# Patient Record
Sex: FEMALE | Race: BLACK OR AFRICAN AMERICAN
[De-identification: names, ages, dates, MRNs, and addresses within clinical notes are randomized per-mention and may not be internally consistent; named-entity substitution may affect disease eponyms.]

---

## 2018-05-09 ENCOUNTER — HOSPITAL ENCOUNTER (INPATIENT)
Dept: HOSPITAL 72 - EMR | Age: 73
LOS: 3 days | Discharge: HOME | DRG: 312 | End: 2018-05-12
Payer: MEDICARE

## 2018-05-09 VITALS — DIASTOLIC BLOOD PRESSURE: 69 MMHG | SYSTOLIC BLOOD PRESSURE: 162 MMHG

## 2018-05-09 VITALS — BODY MASS INDEX: 29.82 KG/M2 | WEIGHT: 190 LBS | HEIGHT: 67 IN

## 2018-05-09 DIAGNOSIS — R19.7: ICD-10-CM

## 2018-05-09 DIAGNOSIS — I11.9: ICD-10-CM

## 2018-05-09 DIAGNOSIS — R55: Primary | ICD-10-CM

## 2018-05-09 DIAGNOSIS — F09: ICD-10-CM

## 2018-05-09 DIAGNOSIS — I16.0: ICD-10-CM

## 2018-05-09 PROCEDURE — 93880 EXTRACRANIAL BILAT STUDY: CPT

## 2018-05-09 PROCEDURE — 85730 THROMBOPLASTIN TIME PARTIAL: CPT

## 2018-05-09 PROCEDURE — 87045 FECES CULTURE AEROBIC BACT: CPT

## 2018-05-09 PROCEDURE — 83690 ASSAY OF LIPASE: CPT

## 2018-05-09 PROCEDURE — 93005 ELECTROCARDIOGRAM TRACING: CPT

## 2018-05-09 PROCEDURE — 80053 COMPREHEN METABOLIC PANEL: CPT

## 2018-05-09 PROCEDURE — 83880 ASSAY OF NATRIURETIC PEPTIDE: CPT

## 2018-05-09 PROCEDURE — 81003 URINALYSIS AUTO W/O SCOPE: CPT

## 2018-05-09 PROCEDURE — 87181 SC STD AGAR DILUTION PER AGT: CPT

## 2018-05-09 PROCEDURE — 84484 ASSAY OF TROPONIN QUANT: CPT

## 2018-05-09 PROCEDURE — 85025 COMPLETE CBC W/AUTO DIFF WBC: CPT

## 2018-05-09 PROCEDURE — 86850 RBC ANTIBODY SCREEN: CPT

## 2018-05-09 PROCEDURE — 86901 BLOOD TYPING SEROLOGIC RH(D): CPT

## 2018-05-09 PROCEDURE — 99285 EMERGENCY DEPT VISIT HI MDM: CPT

## 2018-05-09 PROCEDURE — 86900 BLOOD TYPING SEROLOGIC ABO: CPT

## 2018-05-09 PROCEDURE — 93306 TTE W/DOPPLER COMPLETE: CPT

## 2018-05-09 PROCEDURE — 85610 PROTHROMBIN TIME: CPT

## 2018-05-09 PROCEDURE — 82550 ASSAY OF CK (CPK): CPT

## 2018-05-09 PROCEDURE — 71045 X-RAY EXAM CHEST 1 VIEW: CPT

## 2018-05-09 PROCEDURE — 87324 CLOSTRIDIUM AG IA: CPT

## 2018-05-09 PROCEDURE — 36415 COLL VENOUS BLD VENIPUNCTURE: CPT

## 2018-05-10 VITALS — DIASTOLIC BLOOD PRESSURE: 59 MMHG | SYSTOLIC BLOOD PRESSURE: 194 MMHG

## 2018-05-10 VITALS — SYSTOLIC BLOOD PRESSURE: 177 MMHG | DIASTOLIC BLOOD PRESSURE: 76 MMHG

## 2018-05-10 VITALS — DIASTOLIC BLOOD PRESSURE: 84 MMHG | SYSTOLIC BLOOD PRESSURE: 209 MMHG

## 2018-05-10 VITALS — SYSTOLIC BLOOD PRESSURE: 143 MMHG | DIASTOLIC BLOOD PRESSURE: 76 MMHG

## 2018-05-10 VITALS — SYSTOLIC BLOOD PRESSURE: 189 MMHG | DIASTOLIC BLOOD PRESSURE: 77 MMHG

## 2018-05-10 VITALS — SYSTOLIC BLOOD PRESSURE: 147 MMHG | DIASTOLIC BLOOD PRESSURE: 76 MMHG

## 2018-05-10 VITALS — DIASTOLIC BLOOD PRESSURE: 89 MMHG | SYSTOLIC BLOOD PRESSURE: 177 MMHG

## 2018-05-10 VITALS — DIASTOLIC BLOOD PRESSURE: 80 MMHG | SYSTOLIC BLOOD PRESSURE: 148 MMHG

## 2018-05-10 VITALS — SYSTOLIC BLOOD PRESSURE: 128 MMHG | DIASTOLIC BLOOD PRESSURE: 63 MMHG

## 2018-05-10 VITALS — DIASTOLIC BLOOD PRESSURE: 90 MMHG | SYSTOLIC BLOOD PRESSURE: 152 MMHG

## 2018-05-10 VITALS — DIASTOLIC BLOOD PRESSURE: 60 MMHG | SYSTOLIC BLOOD PRESSURE: 122 MMHG

## 2018-05-10 LAB
ADD MANUAL DIFF: NO
ALBUMIN SERPL-MCNC: 3.3 G/DL (ref 3.4–5)
ALBUMIN/GLOB SERPL: 0.8 {RATIO} (ref 1–2.7)
ALP SERPL-CCNC: 53 U/L (ref 46–116)
ALT SERPL-CCNC: 27 U/L (ref 12–78)
ANION GAP SERPL CALC-SCNC: 9 MMOL/L (ref 5–15)
APPEARANCE UR: CLEAR
APTT BLD: 22 SEC (ref 23–33)
APTT PPP: YELLOW S
AST SERPL-CCNC: 37 U/L (ref 15–37)
BASOPHILS NFR BLD AUTO: 0.4 % (ref 0–2)
BILIRUB SERPL-MCNC: 0.5 MG/DL (ref 0.2–1)
BUN SERPL-MCNC: 14 MG/DL (ref 7–18)
CALCIUM SERPL-MCNC: 8.7 MG/DL (ref 8.5–10.1)
CHLORIDE SERPL-SCNC: 108 MMOL/L (ref 98–107)
CK SERPL-CCNC: 163 U/L (ref 26–308)
CO2 SERPL-SCNC: 23 MMOL/L (ref 21–32)
CREAT SERPL-MCNC: 0.9 MG/DL (ref 0.55–1.3)
EOSINOPHIL NFR BLD AUTO: 1.2 % (ref 0–3)
ERYTHROCYTE [DISTWIDTH] IN BLOOD BY AUTOMATED COUNT: 12.8 % (ref 11.6–14.8)
GLOBULIN SER-MCNC: 4.1 G/DL
GLUCOSE UR STRIP-MCNC: NEGATIVE MG/DL
HCT VFR BLD CALC: 37 % (ref 37–47)
HGB BLD-MCNC: 12 G/DL (ref 12–16)
INR PPP: 1 (ref 0.9–1.1)
KETONES UR QL STRIP: NEGATIVE
LEUKOCYTE ESTERASE UR QL STRIP: NEGATIVE
LYMPHOCYTES NFR BLD AUTO: 11.2 % (ref 20–45)
MCV RBC AUTO: 89 FL (ref 80–99)
MONOCYTES NFR BLD AUTO: 4.9 % (ref 1–10)
NEUTROPHILS NFR BLD AUTO: 82.2 % (ref 45–75)
NITRITE UR QL STRIP: NEGATIVE
PH UR STRIP: 7 [PH] (ref 4.5–8)
PLATELET # BLD: 198 K/UL (ref 150–450)
POTASSIUM SERPL-SCNC: 4.5 MMOL/L (ref 3.5–5.1)
PROT UR QL STRIP: NEGATIVE
RBC # BLD AUTO: 4.16 M/UL (ref 4.2–5.4)
SODIUM SERPL-SCNC: 140 MMOL/L (ref 136–145)
SP GR UR STRIP: 1.01 (ref 1–1.03)
UROBILINOGEN UR-MCNC: NORMAL MG/DL (ref 0–1)
WBC # BLD AUTO: 7.9 K/UL (ref 4.8–10.8)

## 2018-05-10 RX ADMIN — HEPARIN SODIUM SCH UNITS: 5000 INJECTION INTRAVENOUS; SUBCUTANEOUS at 21:02

## 2018-05-10 RX ADMIN — HEPARIN SODIUM SCH UNITS: 5000 INJECTION INTRAVENOUS; SUBCUTANEOUS at 09:26

## 2018-05-10 RX ADMIN — HYDRALAZINE HYDROCHLORIDE SCH MG: 25 TABLET ORAL at 09:25

## 2018-05-10 NOTE — CARDIOLOGY REPORT
--------------- APPROVED REPORT --------------





EKG Measurement

Heart [id number]

IA 

[id number]

[id number]

[id number]

[id number]





Normal sinus rhythm

Left axis deviation

Minimal voltage criteria for LVH, may be normal variant

Nonspecific T wave abnormality

Abnormal ECG

## 2018-05-10 NOTE — HISTORY AND PHYSICAL REPORT
DATE OF ADMISSION:  05/09/2018



CHIEF COMPLAINT:  Syncopal episode.



HISTORY OF PRESENT ILLNESS:  This is a 73-year-old  female

who was brought in by the paramedics.  The patient was on the toilet seat

when she passed out.  She is confused and unable to give any further

information.



PAST MEDICAL HISTORY:

1. Hypertensive cardiovascular disease.

2. Organic brain syndrome.



HOME MEDICATIONS:  Clonidine, hydroxyzine, multivitamin, omeprazole, and

potassium chloride.



ALLERGIES:  No known drug allergies.



FAMILY HISTORY:  Unable to obtain.



SOCIAL HISTORY:  Unable to obtain.



REVIEW OF SYSTEMS:  Unable to obtain.  She is confused.



PHYSICAL EXAMINATION:

GENERAL:  This is an elderly  female who is in no acute

distress.

VITAL SIGNS:  Blood pressure 148/80, pulse 64 and regular, respirations 20,

and temperature 98.9 degrees.

HEENT:  The head is normocephalic and atraumatic.  Pupils are equal, round,

and reactive to light and accommodation consensually.

NECK:  Supple.  Trachea midline.  There was no lymphadenopathy or

thyromegaly.

LUNGS:  Clear to auscultation and percussion.

HEART:  Regular rate and rhythm without rubs, murmurs, or gallops.

ABDOMEN:  Soft and nontender.  Bowel sounds were active.

EXTREMITIES:  No clubbing, cyanosis, or edema.

NEUROLOGICAL:  She is alert, but confused.  There were no gross focal

findings.



LABORATORY AND ANCILLARY DATA:  CBC within normal limits.  CMP, glucose

152, otherwise within normal limits.  Chest x-ray, no acute disease.



ASSESSMENT:  Syncopal episode.  No clear etiology.



PLAN:

1. Obtain a 2D echo and carotid duplex.

2. Obtain orthostatic vital signs.









  ______________________________________________

  Alex Ramirez M.D.





DR:  FRANCIS

D:  05/10/2018 16:32

T:  05/10/2018 23:15

JOB#:  [id number]

CC:

## 2018-05-10 NOTE — DIAGNOSTIC IMAGING REPORT
Indication: Dyspnea

 

Comparison:  12/31/2014

 

A single view chest radiograph was obtained.

 

Findings:

 

No definite infiltrate or pulmonary vascular congestion identified.  The heart is

enlarged. The aorta is mildly enlarged consistent with atherosclerotic vascular

disease.  The bones are osteopenic.

 

Impression:

 

No acute disease

## 2018-05-10 NOTE — EMERGENCY ROOM REPORT
History of Present Illness


General


Chief Complaint:  Syncope


Source:  Patient, EMS





Present Illness


HPI


The patient presents with syncope.  She was sitting on the toilet when she 

passed out.  She's been having loose stools.  In addition to that she's been 

complaining about epigastric and diffuse abdominal crampiness.  She rates pain 

at 0 for RN and will not rate for me.  Diffuse in abdomen, not radiate.





The patient was admitted several years ago for syncopal episode and required 

blood transfusions.  She denies melena, coffee-ground emesis he hematemesis or 

hematochezia.





She also denies chest pain, palpitations, fever chills.  She denies taking 

antibiotics recently.





Paramedics found her with stable vital signs but then she became hypotensive 

when they stood her up.  They started giving her IV fluids at that time.





H/O HTN.


Allergies:  


Coded Allergies:  


     No Known Allergies (Unverified , 12/31/14)





Patient History


Past Medical History:  see triage record


Social History:  Denies: smoking, alcohol use, drug use


Social History Narrative


Born in [facility]


Last Menstrual Period:  NA


Pregnant Now:  No


Reviewed Nursing Documentation:  PMH: Agreed; PSxH: Agreed





Nursing Documentation-PMH


Hx Cardiac Problems:  Yes


Hx Hypertension:  Yes





Review of Systems


All Other Systems:  negative except mentioned in HPI





Physical Exam





Vital Signs








  Date Time  Temp Pulse Resp B/P (MAP) Pulse Ox O2 Delivery O2 Flow Rate FiO2


 


5/9/18 23:14 98.7 60 18 166/79 98 Room Air  





 98.8       








Sp02 EP Interpretation:  reviewed, normal


General Appearance:  well appearing, no apparent distress, GCS 15


Head:  normocephalic


Eyes:  bilateral eye PERRL, bilateral eye conjunctivae pale


ENT:  moist mucus membranes


Neck:  supple


Respiratory:  lungs clear, normal breath sounds


Cardiovascular #1:  regular rate, rhythm


Cardiovascular #2:  2+ radial (R)


Gastrointestinal:  normal inspection, normal bowel sounds, no mass, non-

distended, no guarding, no rebound, tenderness - diffuse


Rectal:  heme negative stool - copious diarrhea


Musculoskeletal:  back normal, gait/station normal, normal range of motion


Neurologic:  alert, oriented x3, CNs III-XII nml as tested, motor strength/tone 

normal, DTRs symmetric, sensory intact, cerebellar normal, speech normal


Psychiatric:  mood/affect normal


Skin:  normal inspection, warm/dry





Medical Decision Making


Diagnostic Impression:  


 Primary Impression:  


 Syncope


 Qualified Codes:  R55 - Syncope and collapse


 Additional Impressions:  


 Colitis


 Hypertension


 Qualified Codes:  I10 - Essential (primary) hypertension


ER Course


Patient presents with syncope.  Differential includes gastroenteritis, vasovagal

, arrhythmia, acute myocardial infarction amongst others.  The patient was 

evaluated EKG, chest x-ray and labs.  Based on the neurologic exam and the 

history, a CT of the head is not indicated at this time  Patient will receive 

IV hydration.  The fact that she was orthostatic in the field suggest volume 

depletion.  She is quite pale at this time and we will set up to possible 

transfuse if necessary.  





EKG shows no injury.  Chest x-ray is unremarkable.  Laboratory with normal 

white count. H&H is minimally low.  CMP is unremarkable





The patient had several bouts of copious amounts of watery diarrhea that was 

guaiac negative.  This was sent for a stool culture and also C. difficile.





The patient's blood pressure became quite high.  This necessitated treatment 

with clonidine 0.1 mg twice.





The patient is improved with observation however due to the syncopal episode we 

need to admit her for cardiac observation.





The patient is admitted to Dr. Witt to telemetry.





Laboratory Tests








Test


  5/9/18


23:41 5/10/18


02:38


 


White Blood Count


  7.9 K/UL


(4.8-10.8) 


 


 


Red Blood Count


  4.16 M/UL


(4.20-5.40)  L 


 


 


Hemoglobin


  12.0 G/DL


(12.0-16.0) 


 


 


Hematocrit


  37.0 %


(37.0-47.0) 


 


 


Mean Corpuscular Volume 89 FL (80-99)   


 


Mean Corpuscular Hemoglobin


  28.8 PG


(27.0-31.0) 


 


 


Mean Corpuscular Hemoglobin


Concent 32.4 G/DL


(32.0-36.0) 


 


 


Red Cell Distribution Width


  12.8 %


(11.6-14.8) 


 


 


Platelet Count


  198 K/UL


(150-450) 


 


 


Mean Platelet Volume


  7.8 FL


(6.5-10.1) 


 


 


Neutrophils (%) (Auto)


  82.2 %


(45.0-75.0)  H 


 


 


Lymphocytes (%) (Auto)


  11.2 %


(20.0-45.0)  L 


 


 


Monocytes (%) (Auto)


  4.9 %


(1.0-10.0) 


 


 


Eosinophils (%) (Auto)


  1.2 %


(0.0-3.0) 


 


 


Basophils (%) (Auto)


  0.4 %


(0.0-2.0) 


 


 


Prothrombin Time


  10.2 SEC


(9.30-11.50) 


 


 


Prothrombin Time INR 1.0 (0.9-1.1)   


 


PTT


  22 SEC (23-33)


L 


 


 


Sodium Level


  140 MMOL/L


(136-145) 


 


 


Potassium Level


  4.5 MMOL/L


(3.5-5.1) 


 


 


Chloride Level


  108 MMOL/L


()  H 


 


 


Carbon Dioxide Level


  23 MMOL/L


(21-32) 


 


 


Anion Gap


  9 mmol/L


(5-15) 


 


 


Blood Urea Nitrogen


  14 mg/dL


(7-18) 


 


 


Creatinine


  0.9 MG/DL


(0.55-1.30) 


 


 


Estimate Glomerular


Filtration Rate  mL/min (>60)  


  


 


 


Glucose Level


  152 MG/DL


()  H 


 


 


Calcium Level


  8.7 MG/DL


(8.5-10.1) 


 


 


Total Bilirubin


  0.5 MG/DL


(0.2-1.0) 


 


 


Aspartate Amino Transferase


(AST) 37 U/L (15-37)


  


 


 


Alanine Aminotransferase (ALT)


  27 U/L (12-78)


  


 


 


Alkaline Phosphatase


  53 U/L


() 


 


 


Total Creatine Kinase


  163 U/L


() 


 


 


Troponin I


  0.000 ng/mL


(0.000-0.056) 


 


 


Pro-B-Type Natriuretic Peptide


  35 pg/mL


(0-125) 


 


 


Total Protein


  7.4 G/DL


(6.4-8.2) 


 


 


Albumin


  3.3 G/DL


(3.4-5.0)  L 


 


 


Globulin 4.1 g/dL   


 


Albumin/Globulin Ratio


  0.8 (1.0-2.7)


L 


 


 


Lipase


  124 U/L


() 


 


 


Urine Color  Yellow  


 


Urine Appearance  Clear  


 


Urine pH  7 (4.5-8.0)  


 


Urine Specific Gravity


  


  1.010


(1.005-1.035)


 


Urine Protein


  


  Negative


(NEGATIVE)


 


Urine Glucose (UA)


  


  Negative


(NEGATIVE)


 


Urine Ketones


  


  Negative


(NEGATIVE)


 


Urine Occult Blood


  


  1+ (NEGATIVE)


H


 


Urine Nitrite


  


  Negative


(NEGATIVE)


 


Urine Bilirubin


  


  Negative


(NEGATIVE)


 


Urine Urobilinogen


  


  Normal MG/DL


(0.0-1.0)


 


Urine Leukocyte Esterase


  


  Negative


(NEGATIVE)


 


Urine RBC


  


  2-4 /HPF (0 -


2)  H


 


Urine WBC


  


  0 /HPF (0 - 2)


 


 


Urine Squamous Epithelial


Cells 


  Few /LPF


(NONE/OCC)


 


Urine Bacteria


  


  None /HPF


(NONE)








EKG Diagnostic Results


Rate:  normal


Rhythm:  NSR


ST Segments:  no acute changes





Rhythm Strip Diag. Results


EP Interpretation:  yes


Rhythm:  NSR, no PVC's, no ectopy





Chest X-Ray Diagnostic Results


Chest X-Ray Diagnostic Results :  


   Chest X-Ray Ordered:  Yes


   Interpretation:  no consolidation, no effusion, no pneumothorax, other - 

Cardiomegaly


   Impression:  Other


   Electronically Signed by:  Electronically signed by Jarett Mendenhall MD


Status:  improved


Disposition:  ADMITTED AS INPATIENT


Condition:  Serious


Referrals:  


NON PHYSICIAN (PCP)











Jarett Mendenhall M.D. May 10, 2018 02:36

## 2018-05-11 VITALS — DIASTOLIC BLOOD PRESSURE: 74 MMHG | SYSTOLIC BLOOD PRESSURE: 156 MMHG

## 2018-05-11 VITALS — SYSTOLIC BLOOD PRESSURE: 169 MMHG | DIASTOLIC BLOOD PRESSURE: 77 MMHG

## 2018-05-11 VITALS — SYSTOLIC BLOOD PRESSURE: 153 MMHG | DIASTOLIC BLOOD PRESSURE: 77 MMHG

## 2018-05-11 VITALS — SYSTOLIC BLOOD PRESSURE: 125 MMHG | DIASTOLIC BLOOD PRESSURE: 71 MMHG

## 2018-05-11 VITALS — DIASTOLIC BLOOD PRESSURE: 66 MMHG | SYSTOLIC BLOOD PRESSURE: 133 MMHG

## 2018-05-11 VITALS — SYSTOLIC BLOOD PRESSURE: 166 MMHG | DIASTOLIC BLOOD PRESSURE: 76 MMHG

## 2018-05-11 RX ADMIN — HEPARIN SODIUM SCH UNITS: 5000 INJECTION INTRAVENOUS; SUBCUTANEOUS at 21:27

## 2018-05-11 RX ADMIN — HEPARIN SODIUM SCH UNITS: 5000 INJECTION INTRAVENOUS; SUBCUTANEOUS at 08:48

## 2018-05-11 RX ADMIN — HYDRALAZINE HYDROCHLORIDE SCH MG: 25 TABLET ORAL at 08:46

## 2018-05-11 NOTE — GENERAL PROGRESS NOTE
Assessment/Plan


Assessment/Plan


Syncope m/p post defecation (vaso depressive).


W/u in progress.


Check orthosthatics.


DW pt's daughter yesterday 7 PM





Subjective


Allergies:  


Coded Allergies:  


     No Known Allergies (Unverified , 12/31/14)


Subjective


No new c/o





Objective





Last 24 Hour Vital Signs








  Date Time  Temp Pulse Resp B/P (MAP) Pulse Ox O2 Delivery O2 Flow Rate FiO2


 


5/11/18 04:00  61      


 


5/11/18 04:00 98.0 72 18 133/66 97 Room Air  





 98.0       


 


5/11/18 00:00  57      


 


5/11/18 00:00 97.0 59 19 125/71 97 Room Air  





 97.0       


 


5/10/18 20:00 98.1 61 18 122/60 96 Room Air  





 98.1       


 


5/10/18 20:00  68      


 


5/10/18 16:00  58      


 


5/10/18 16:00 98.9 64 21 148/80 97 Room Air  





 98.9       


 


5/10/18 12:00  55      


 


5/10/18 12:00 98.0 54 19 128/63 97 Room Air  





 98.0       


 


5/10/18 09:25    140/76    


 


5/10/18 09:25    140/76    


 


5/10/18 08:00 98.0 53 20 143/76 98 Room Air  





 98.0       

















Intake and Output  


 


 5/10/18 5/11/18





 19:00 07:00


 


Intake Total 560 ml 


 


Balance 560 ml 


 


  


 


Intake Oral 560 ml 


 


# Voids 1 2








Height (Feet):  5


Height (Inches):  7.00


Weight (Pounds):  190


Objective


CV RR


Lungs CTA


Abd SNT. BS +


E No CCE











Alex Ramirez MD May 11, 2018 07:59

## 2018-05-12 VITALS — DIASTOLIC BLOOD PRESSURE: 90 MMHG | SYSTOLIC BLOOD PRESSURE: 182 MMHG

## 2018-05-12 VITALS — DIASTOLIC BLOOD PRESSURE: 94 MMHG | SYSTOLIC BLOOD PRESSURE: 141 MMHG

## 2018-05-12 VITALS — SYSTOLIC BLOOD PRESSURE: 184 MMHG | DIASTOLIC BLOOD PRESSURE: 97 MMHG

## 2018-05-12 VITALS — SYSTOLIC BLOOD PRESSURE: 185 MMHG | DIASTOLIC BLOOD PRESSURE: 91 MMHG

## 2018-05-12 RX ADMIN — HYDRALAZINE HYDROCHLORIDE SCH MG: 25 TABLET ORAL at 08:50

## 2018-05-12 RX ADMIN — HEPARIN SODIUM SCH UNITS: 5000 INJECTION INTRAVENOUS; SUBCUTANEOUS at 10:41

## 2018-05-12 NOTE — CARDIOLOGY REPORT
--------------- APPROVED REPORT --------------





EXAM: Two-dimensional and M-mode echocardiogram with Doppler and color 

Doppler.



INDICATION

Syncope 



M-Mode DIMENSIONS 

IVSd2.0 (0.7-1.1cm)Left Atrium (MM)3.3 (1.6-4.0cm)

LVDd4.4 (3.5-5.6cm)Aortic Root3.4 (2.0-3.7cm)

PWd1.5 (0.7-1.1cm)Aortic Cusp Exc.1.6 (1.5-2.0cm)



LVDs2.7 (2.5-4.0cm)

PWs1.3 cm





Normal left ventricular chamber size, systolic function and wall motion.

Left ventricular ejection fraction estimated to be 60-65 %.

Moderate left ventricular hypertrophy.

Anterior Echo-free space, may be due to pericardial fat or effusion.

All other cardiac chamber sizes are within normal limits. 

Focal aortic valve sclerosis with adequate cusp excursion.

Thickened mitral valve leaflets with normal excursion.

Mild mitral annulus and aortic root calcification.

Pulmonic valve not well visualized.

Normal tricuspid valve structure. 

IVC dilated at 2.0 cm with physiological collapse. 



A  color flow and spectral Doppler study was performed and revealed:

No aortic insufficiency.

Mild mitral regurgitation.

Mitral diastolic velocities suggest mild left ventricular diastolic dysfunction (Grade 

I).

Trace tricuspid regurgitation.

Tricuspid  systolic velocities suggests peak right ventricular systolic pressure of 23  

mmHg.

Trace pulmonic regurgitation present.

## 2018-05-12 NOTE — GENERAL PROGRESS NOTE
Assessment/Plan


Assessment/Plan


Syncope m/p post defecation (vaso depressive).


W/u in progress.


Check orthosthatics.


DW pt's daughter.


All tests are WNL.





DC home.





Subjective


Allergies:  


Coded Allergies:  


     No Known Allergies (Unverified , 12/31/14)


Subjective


No new c/o





Objective





Last 24 Hour Vital Signs








  Date Time  Temp Pulse Resp B/P (MAP) Pulse Ox O2 Delivery O2 Flow Rate FiO2


 


5/12/18 08:50    184/97    


 


5/12/18 08:50    184/97    


 


5/12/18 08:00 97.7 61 18 184/97 98   





 97.7       


 


5/12/18 04:00  54      


 


5/12/18 04:00 97.5 53 16 182/90 98   





 97.5       


 


5/12/18 00:00  49      


 


5/12/18 00:00 97.9 49 16 185/91 99   





 97.9       


 


5/11/18 21:00  53      





  57      





  54      


 


5/11/18 20:00  50      


 


5/11/18 20:00 98.1 54 16 169/77 99   





 98.1       


 


5/11/18 16:00 97.9 53 19 166/76 98 Room Air  





 97.9       


 


5/11/18 16:00  47      


 


5/11/18 12:00 97.0 54 19 153/77 94 Room Air  





 97.0       


 


5/11/18 12:00  49      

















Intake and Output  


 


 5/11/18 5/12/18





 19:00 07:00


 


Intake Total 250 ml 


 


Output Total 700 ml 


 


Balance -450 ml 


 


  


 


Intake Oral 250 ml 


 


Output Urine Total 700 ml 


 


# Voids 1 3








Height (Feet):  5


Height (Inches):  7.00


Weight (Pounds):  190


Objective


CV RR


Lungs CTA


Abd SNT. BS +


E No CCE











Alex Ramirez MD May 12, 2018 10:52

## 2018-05-13 ENCOUNTER — HOSPITAL ENCOUNTER (EMERGENCY)
Dept: HOSPITAL 72 - EMR | Age: 73
LOS: 1 days | Discharge: HOME | End: 2018-05-14
Payer: MEDICARE

## 2018-05-13 VITALS — SYSTOLIC BLOOD PRESSURE: 148 MMHG | DIASTOLIC BLOOD PRESSURE: 59 MMHG

## 2018-05-13 VITALS — WEIGHT: 205 LBS | HEIGHT: 67 IN | BODY MASS INDEX: 32.18 KG/M2

## 2018-05-13 VITALS — SYSTOLIC BLOOD PRESSURE: 190 MMHG | DIASTOLIC BLOOD PRESSURE: 78 MMHG

## 2018-05-13 DIAGNOSIS — I10: ICD-10-CM

## 2018-05-13 DIAGNOSIS — R07.9: Primary | ICD-10-CM

## 2018-05-13 LAB
ADD MANUAL DIFF: NO
ALBUMIN SERPL-MCNC: 3.7 G/DL (ref 3.4–5)
ALBUMIN/GLOB SERPL: 0.8 {RATIO} (ref 1–2.7)
ALP SERPL-CCNC: 68 U/L (ref 46–116)
ALT SERPL-CCNC: 32 U/L (ref 12–78)
ANION GAP SERPL CALC-SCNC: 8 MMOL/L (ref 5–15)
APPEARANCE UR: CLEAR
APTT PPP: YELLOW S
AST SERPL-CCNC: 32 U/L (ref 15–37)
BASOPHILS NFR BLD AUTO: 1.1 % (ref 0–2)
BILIRUB SERPL-MCNC: 0.4 MG/DL (ref 0.2–1)
BUN SERPL-MCNC: 16 MG/DL (ref 7–18)
CALCIUM SERPL-MCNC: 9.3 MG/DL (ref 8.5–10.1)
CHLORIDE SERPL-SCNC: 104 MMOL/L (ref 98–107)
CK MB SERPL-MCNC: 0.6 NG/ML (ref 0–3.6)
CK SERPL-CCNC: 112 U/L (ref 26–308)
CO2 SERPL-SCNC: 28 MMOL/L (ref 21–32)
CREAT SERPL-MCNC: 0.9 MG/DL (ref 0.55–1.3)
EOSINOPHIL NFR BLD AUTO: 2.1 % (ref 0–3)
ERYTHROCYTE [DISTWIDTH] IN BLOOD BY AUTOMATED COUNT: 12.7 % (ref 11.6–14.8)
GLOBULIN SER-MCNC: 4.7 G/DL
GLUCOSE UR STRIP-MCNC: NEGATIVE MG/DL
HCT VFR BLD CALC: 40.7 % (ref 37–47)
HGB BLD-MCNC: 13.2 G/DL (ref 12–16)
KETONES UR QL STRIP: NEGATIVE
LEUKOCYTE ESTERASE UR QL STRIP: (no result)
LYMPHOCYTES NFR BLD AUTO: 17.6 % (ref 20–45)
MCV RBC AUTO: 88 FL (ref 80–99)
MONOCYTES NFR BLD AUTO: 7.7 % (ref 1–10)
NEUTROPHILS NFR BLD AUTO: 71.6 % (ref 45–75)
NITRITE UR QL STRIP: NEGATIVE
PH UR STRIP: 6 [PH] (ref 4.5–8)
PLATELET # BLD: 237 K/UL (ref 150–450)
POTASSIUM SERPL-SCNC: 4.7 MMOL/L (ref 3.5–5.1)
PROT UR QL STRIP: (no result)
RBC # BLD AUTO: 4.63 M/UL (ref 4.2–5.4)
SODIUM SERPL-SCNC: 140 MMOL/L (ref 136–145)
SP GR UR STRIP: 1.01 (ref 1–1.03)
UROBILINOGEN UR-MCNC: 1 MG/DL (ref 0–1)
WBC # BLD AUTO: 6.8 K/UL (ref 4.8–10.8)

## 2018-05-13 PROCEDURE — 85379 FIBRIN DEGRADATION QUANT: CPT

## 2018-05-13 PROCEDURE — 71275 CT ANGIOGRAPHY CHEST: CPT

## 2018-05-13 PROCEDURE — 71045 X-RAY EXAM CHEST 1 VIEW: CPT

## 2018-05-13 PROCEDURE — 80053 COMPREHEN METABOLIC PANEL: CPT

## 2018-05-13 PROCEDURE — 84484 ASSAY OF TROPONIN QUANT: CPT

## 2018-05-13 PROCEDURE — 99284 EMERGENCY DEPT VISIT MOD MDM: CPT

## 2018-05-13 PROCEDURE — 96374 THER/PROPH/DIAG INJ IV PUSH: CPT

## 2018-05-13 PROCEDURE — 82550 ASSAY OF CK (CPK): CPT

## 2018-05-13 PROCEDURE — 85025 COMPLETE CBC W/AUTO DIFF WBC: CPT

## 2018-05-13 PROCEDURE — 82553 CREATINE MB FRACTION: CPT

## 2018-05-13 PROCEDURE — 36415 COLL VENOUS BLD VENIPUNCTURE: CPT

## 2018-05-13 PROCEDURE — 93005 ELECTROCARDIOGRAM TRACING: CPT

## 2018-05-13 PROCEDURE — 81003 URINALYSIS AUTO W/O SCOPE: CPT

## 2018-05-13 NOTE — EMERGENCY ROOM REPORT
History of Present Illness


General


Chief Complaint:  Chest Pain


Source:  Patient





Present Illness


HPI


Is a 73-year-old female with a history of high blood pressure.  She was just 

discharged from the hospital for syncope.  She was discharged yesterday.  Last 

night she developed chest pain to the left chest area.  Sharp in nature.  Worse 

when she coughs.  He catches her breath.  No fever chills.  No nausea no 

vomiting.  No diaphoresis.  No short of breath.  Pain radiates to the back.  

Never had this problem before.  Did not have pain when she was in the hospital.

  Has not take anything for it.  Came by car.


Allergies:  


Coded Allergies:  


     No Known Allergies (Unverified , 12/31/14)





Patient History


Past Medical History:  see triage record, old chart reviewed, HTN


Past Surgical History:  other


Pertinent Family History:  none


Social History:  Denies: smoking


Last Menstrual Period:  NA


Pregnant Now:  No


Immunizations:  other


Reviewed Nursing Documentation:  PMH: Agreed; PSxH: Agreed





Nursing Documentation-PMH


Hx Cardiac Problems:  Yes


Hx Hypertension:  Yes


Hx Cancer:  No


Hx Gastrointestinal Problems:  No


Hx Neurological Problems:  No





Review of Systems


Eye:  Denies: eye pain, blurred vision


ENT:  Denies: ear pain, nose congestion, throat swelling


Respiratory:  Denies: cough, shortness of breath


Cardiovascular:  Reports: chest pain; Denies: palpitations


Gastrointestinal:  Denies: abdominal pain, diarrhea, nausea, vomiting


Musculoskeletal:  Denies: back pain, joint pain


Skin:  Denies: rash


Neurological:  Denies: headache, numbness


Endocrine:  Denies: increased thirst, increased urine


Hematologic/Lymphatic:  Denies: easy bruising


All Other Systems:  negative except mentioned in HPI





Physical Exam





Vital Signs








  Date Time  Temp Pulse Resp B/P (MAP) Pulse Ox O2 Delivery O2 Flow Rate FiO2


 


5/13/18 22:32 98.4 74 18 238/102 98 Room Air  





 98.4       





vitals with high blood pressure


Sp02 EP Interpretation:  reviewed, normal


General Appearance:  well appearing, no apparent distress, alert


Head:  normocephalic, atraumatic


Eyes:  bilateral eye PERRL, bilateral eye EOMI


ENT:  hearing grossly normal, normal pharynx


Neck:  full range of motion, supple, no meningismus


Respiratory:  chest non-tender, lungs clear, normal breath sounds


Cardiovascular #1:  regular rate, rhythm, no murmur


Gastrointestinal:  normal bowel sounds, non tender, no mass, no organomegaly, 

no bruit, non-distended


Musculoskeletal:  back normal, gait/station normal, normal range of motion


Psychiatric:  mood/affect normal


Skin:  warm/dry





Medical Decision Making


Diagnostic Impression:  


 Primary Impression:  


 Chest pain


 Qualified Codes:  R07.9 - Chest pain, unspecified


 Additional Impression:  


 Hypertension


 Qualified Codes:  I10 - Essential (primary) hypertension


ER Course


Patient presents with chest pain is been constant for almost 24 hours.  

Troponin negative.  CT scan negative for PE.  She felt better now.  Blood 

pressure much improved.  Pain is most likely muscle skeletal versus 

atelectasis.  We'll discharge home.  No evidence of ACS, PE, dissection to name 

a few.


Lab Results Impression


labs unremarkable


EKG Diagnostic Results


Rate:  normal


Rhythm:  NSR


ST Segments:  other - NSST changes


ASA given to the pt in ED:  Yes





Rhythm Strip Diag. Results


Rhythm Strip Time:  23:02


EP Interpretation:  yes


Rate:  67


Rhythm:  NSR, no PVC's, no ectopy





Chest X-Ray Diagnostic Results


Chest X-Ray Diagnostic Results :  


   Chest X-Ray Ordered:  Yes


   # of Views/Limited/Complete:  1 View


   Indication:  Chest Pain


   EP Interpretation:  Yes


   Interpretation:  no consolidation, no effusion, no pneumothorax, no acute 

cardiopulmonary disease


   Impression:  No acute disease


   Electronically Signed by:  Keyon Guevara MD





CT/MRI/US Diagnostic Results


CT/MRI/US Diagnostic Results :  


   Imaging Test Ordered:  CT chest


   Impression


negative per radiologist other than mild atelectasis





Last Vital Signs








  Date Time  Temp Pulse Resp B/P (MAP) Pulse Ox O2 Delivery O2 Flow Rate FiO2


 


5/13/18 22:32 98.4 74 18 238/102 98 Room Air  





 98.4       








Status:  improved


Disposition:  HOME, SELF-CARE


Condition:  Stable


Referrals:  


NON PHYSICIAN (PCP)


Patient Instructions:  Nonspecific Chest Pain





Additional Instructions:  


Follow-up with your DrDick in 2 to 3 days.  Return of worse.











KEYON GUEVARA M.D. May 13, 2018 23:02

## 2018-05-14 VITALS — DIASTOLIC BLOOD PRESSURE: 68 MMHG | SYSTOLIC BLOOD PRESSURE: 148 MMHG

## 2018-05-14 VITALS — SYSTOLIC BLOOD PRESSURE: 148 MMHG | DIASTOLIC BLOOD PRESSURE: 68 MMHG

## 2018-05-14 VITALS — DIASTOLIC BLOOD PRESSURE: 62 MMHG | SYSTOLIC BLOOD PRESSURE: 151 MMHG

## 2018-05-14 NOTE — CARDIOLOGY REPORT
--------------- APPROVED REPORT --------------





EKG Measurement

Heart [id number]

ME [id number]

[id number]

[id number]

[id number]





Normal sinus rhythm

Minimal voltage criteria for LVH, may be normal variant

Borderline ECG

## 2018-05-14 NOTE — DIAGNOSTIC IMAGING REPORT
Indication: Chest pain

 

Technique: XRAY Chest 1v

 

Comparison: 5/9/2018

 

Findings: Stable cardiomegaly. Mediastinal is are sharp. There is patchy bibasilar

likely expiratory atelectasis. No definite focal airspace consolidation, pleural

effusion or pneumothorax. There are degenerative changes in the spine. No acute

osseous abnormality seen.

 

Impression: 

Cardiomegaly.

 

Patchy bibasilar likely expiratory atelectasis.

## 2018-05-14 NOTE — DIAGNOSTIC IMAGING REPORT
Indication: Shortness of breath

 

Technique: CT pulmonary angiogram performed utilizing automated exposure control with

intravenous contrast. Axial, sagittal and coronal reconstructions were obtained. 3-D

volumetric reconstructions were also performed.

 

CT dose: Total DLP 1500.6 mGycm; CTDI vol 29.78 mGy

 

Comparison: None

 

Findings: 

Examination limited due to suboptimal contrast opacification of the pulmonary

arteries related to contrast extravasation. Per technologist charting, the ordering

ER physician was notified of the extravasation.

 

There is no large saddle or central pulmonary embolism. Main pulmonary artery is

normal in size. The examination is nondiagnostic to assess for interval in

subsegmental pulmonary emboli. Thoracic aorta appears normal in caliber.

 

There is cardiomegaly. No pericardial effusion. Thyroid is unremarkable. No

pathologically enlarged mediastinal adenopathy.

 

There is atelectatic changes in the bilateral lower lobes. There is no definite focal

airspace consolidation to suggest pneumonia. No pleural effusion or pneumothorax.

There are degenerative changes in the spine. No acute osseous abnormality is seen.

 

Images through the abdomen grossly unremarkable. 

 

IMPRESSION: 

Limited exam due to patient motion as well as suboptimal contrast opacification from

IV infiltration (per technologist notes treating ER physician notified of the

contrast extravasation). 

 

No central pulmonary embolism. Main pulmonary artery normal in size. Exam is

nondiagnostic to assess for smaller segmental and subsegmental pulmonary emboli.

 

No thoracic aortic aneurysm. 

 

Cardiomegaly.

 

Bilateral lower lobe atelectasis. No focal airspace consolidation, pleural effusion

or pneumothorax.

 

This corresponds with the preliminary report.

 

The CT scanner at [facility] is accredited by the [employer] College [employer]

[employer]iology and the scans are performed using protocols designed to limit radiation

exposure to as low as reasonably achievable to attain images of sufficient resolution

adequate for diagnostic evaluation.

## 2018-05-15 NOTE — DISCHARGE SUMMARY
Discharge Summary


DATE OF ADMISSION: 05/09/2018





DATE OF DISCHARGE: 05/12/2018 








REASON FOR ADMISSION: 


73 years old female with past medical history of hypertensive cardiovascular 

disease,organic brain syndrome , had syncopal episode while on in the bathroom 

trying to defecate.  Upon evaluation in emergency room patient was confused and 

unable to provide any further information.  Per paramedics,  patient was 

initially was  stable vital signs, but became hypotensive when she stood up.  

Patient received  bolus of IV fluids  at that time.  Upon evaluation in 

emergency room vital signs were stable.  Laboratory workup was unremarkable.  

However, blood pressure at some point became elevated and patient was giving 

clonidine.  Troponin was negative.  EKG revealed normal sinus rhythm,  no acute 

ischemic changes.  Neurological exam was nonfocal.  CT of the head was not 

indicated at that time.  Patient received IV hydration.  Chest x-ray revealed 

no acute cardiopulmonary pathology.   The patient had several bouts of copious 

amounts of watery diarrhea,  which was guaiac negative.  Stool was sent for C. 

difficile and stool culture.  Patient was admitted to telemetry floor for 

further management with diagnosis of syncopal episode, hypertension, and 

diarrhea , possible  colitis.


 


 


HOSPITAL COURSE: 


Patient admitted to telemetry floor.  Telemetry showed normal sinus rhythm and 

no acute ischemic changes.  Echocardiogram revealed preserved ejection fraction 

of 60-65%, moderate left ventricular hypertrophy. Right ventricular systolic 

pressure of 23.  Carotid duplex was essentially negative.  


Orthostatic vital signs did not show any changes.  Blood pressure was elevated.

  Blood pressure was managed with clonidine and hydralazine . Blood pressure 

eventually stabilized.  DVT and GI prophylaxis provided.  Stool for C. 

difficile was negative. Stool culture revealed Pseudomonas.   Diarrhea stopped.

  Afebrile, no leukocytosis . Syncopal episode occurred while patient was at 

the toilet seat trying to defecate, likely vasovagal.


Diarrhea resolved.  Blood pressure stable.  Patient was stable for discharge 

home.





FINAL DIAGNOSES: 


Syncopal episode (likely vasovagal)


Hypertensive urgency, resolved 


Diarrhea, resolved





DISCHARGE MEDICATIONS:


See Medication Reconciliation list.





DISCHARGE INSTRUCTIONS:


Patient was discharged home, follow-up with a primary care provider next week





I have been assigned to dictate discharge summary for this account. I was not 

involved in the patient's management.











Dipika Herrera NP May 15, 2018 09:02